# Patient Record
Sex: MALE | Race: WHITE | URBAN - METROPOLITAN AREA
[De-identification: names, ages, dates, MRNs, and addresses within clinical notes are randomized per-mention and may not be internally consistent; named-entity substitution may affect disease eponyms.]

---
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
## 2021-03-01 ENCOUNTER — APPOINTMENT (OUTPATIENT)
Dept: RADIOLOGY | Facility: IMAGING CENTER | Age: 21
End: 2021-03-01
Attending: PHYSICIAN ASSISTANT
Payer: COMMERCIAL

## 2021-03-01 ENCOUNTER — OFFICE VISIT (OUTPATIENT)
Dept: URGENT CARE | Facility: CLINIC | Age: 21
End: 2021-03-01
Payer: COMMERCIAL

## 2021-03-01 VITALS
BODY MASS INDEX: 22.2 KG/M2 | HEART RATE: 95 BPM | DIASTOLIC BLOOD PRESSURE: 76 MMHG | OXYGEN SATURATION: 98 % | HEIGHT: 74 IN | TEMPERATURE: 98.3 F | WEIGHT: 173 LBS | RESPIRATION RATE: 16 BRPM | SYSTOLIC BLOOD PRESSURE: 110 MMHG

## 2021-03-01 DIAGNOSIS — R07.9 CHEST PAIN, UNSPECIFIED TYPE: ICD-10-CM

## 2021-03-01 PROCEDURE — 71046 X-RAY EXAM CHEST 2 VIEWS: CPT | Mod: TC,FY | Performed by: PHYSICIAN ASSISTANT

## 2021-03-01 PROCEDURE — 99214 OFFICE O/P EST MOD 30 MIN: CPT | Performed by: PHYSICIAN ASSISTANT

## 2021-03-01 PROCEDURE — 93000 ELECTROCARDIOGRAM COMPLETE: CPT | Performed by: PHYSICIAN ASSISTANT

## 2021-03-01 RX ORDER — IBUPROFEN 800 MG/1
800 TABLET ORAL EVERY 8 HOURS PRN
Qty: 30 TABLET | Refills: 0 | Status: SHIPPED | OUTPATIENT
Start: 2021-03-01

## 2021-03-01 RX ORDER — OMEPRAZOLE 20 MG/1
20 CAPSULE, DELAYED RELEASE ORAL DAILY
Qty: 14 CAPSULE | Refills: 0 | Status: SHIPPED | OUTPATIENT
Start: 2021-03-01 | End: 2021-03-15

## 2021-03-01 ASSESSMENT — ENCOUNTER SYMPTOMS
HEARTBURN: 1
COUGH: 0
MYALGIAS: 0
DIAPHORESIS: 0
PALPITATIONS: 0
DIARRHEA: 0
CHILLS: 0
DIZZINESS: 0
NAUSEA: 0
ABDOMINAL PAIN: 0
HEADACHES: 0
HEMOPTYSIS: 0
FEVER: 0
VOMITING: 0
BLOOD IN STOOL: 0

## 2021-03-01 ASSESSMENT — LIFESTYLE VARIABLES: SUBSTANCE_ABUSE: 0

## 2021-03-02 NOTE — PROGRESS NOTES
Subjective:   Parth Hernandez is a 20 y.o. male who presents for Chest Pain (burning/ pulsating slight pain, comes and goes, worse at times)      HPI:  This is a very pleasant 20-year-old male presenting to the clinic with left-sided chest pain x7 days.  Patient describes the pain as a burning/pulsating sensation.  The pain seems to come and go lasting seconds to minutes then resolving.  The pain is localized to his left chest.  There is no radiation.  He is having a hard time thinking of any aggravating or alleviating factors.  He believes it may be flared up after meals.  He does not believe it is brought on with exercise.  He denies any associated shortness of breath, cough or congestion when these events occur.  Denies any nausea or vomiting.  Denies any recent upper respiratory infection or illness.  Denies any history of asthma.  Denies any history of connective tissue disease or autoimmune disease.  No history of heart disease in the family.  Patient does drink many carbonated drinks daily.  He has also been using them more frequently lately.  He does not drink any alcoholic beverages.  He is a non-smoker.  Does not use any drugs.  He denies any melena or blood in the stool.  Looking back at previous records from [place] it looks like he has had similar symptoms in the past and was diagnosed with GERD after a full cardiac work-up.  He has not been taking any antiacid medication at this time.    Review of Systems   Constitutional: Negative for chills, diaphoresis, fever and malaise/fatigue.   Respiratory: Negative for cough and hemoptysis.    Cardiovascular: Positive for chest pain. Negative for palpitations.   Gastrointestinal: Positive for heartburn. Negative for abdominal pain, blood in stool, diarrhea, melena, nausea and vomiting.   Musculoskeletal: Negative for myalgias.   Skin: Negative for rash.   Neurological: Negative for dizziness and headaches.   Endo/Heme/Allergies: Negative for environmental  "allergies.   Psychiatric/Behavioral: Negative for substance abuse.       Medications:    • This patient does not have an active medication from one of the medication groupers.    Allergies: Patient has no known allergies.    Problem List: Parth Hernandez does not have a problem list on file.    Surgical History:  No past surgical history on file.    Past Social Hx: Parth Hernandez  reports that he has never smoked. He has never used smokeless tobacco.     Past Family Hx:  Parth Hernandez family history is not on file.     Problem list, medications, and allergies reviewed by myself today in Epic.     Objective:     /76 (BP Location: Right arm, Patient Position: Sitting, BP Cuff Size: Adult)   Pulse 95   Temp 36.8 °C (98.3 °F) (Temporal)   Resp 16   Ht 1.88 m (6' 2\")   Wt 78.5 kg (173 lb)   SpO2 98%   BMI 22.21 kg/m²     Physical Exam  Constitutional:       General: He is not in acute distress.     Appearance: Normal appearance. He is not ill-appearing, toxic-appearing or diaphoretic.   HENT:      Head: Normocephalic and atraumatic.      Mouth/Throat:      Pharynx: No oropharyngeal exudate or posterior oropharyngeal erythema.   Eyes:      Conjunctiva/sclera: Conjunctivae normal.   Cardiovascular:      Rate and Rhythm: Normal rate and regular rhythm.      Pulses: Normal pulses.      Heart sounds: Normal heart sounds.   Pulmonary:      Effort: Pulmonary effort is normal.      Breath sounds: Normal breath sounds. No wheezing or rales.   Abdominal:      General: Bowel sounds are normal. There is no distension.      Palpations: Abdomen is soft.      Tenderness: There is no abdominal tenderness. There is no guarding.   Musculoskeletal:      Cervical back: Normal range of motion. No rigidity or tenderness.   Lymphadenopathy:      Cervical: No cervical adenopathy.   Skin:     General: Skin is warm.      Capillary Refill: Capillary refill takes less than 2 seconds.   Neurological:      General: No focal " deficit present.      Mental Status: He is alert and oriented to person, place, and time. Mental status is at baseline.   Psychiatric:         Mood and Affect: Mood normal.         Behavior: Behavior normal.         Thought Content: Thought content normal.         Judgment: Judgment normal.       12- Lead EKG; interpreted by myself   Normal sinus rhythm with a rate of 65 bpm.   Normal axis.  Normal intervals.   Mild ST segment elevation in V3.  No reciprocal changes.  No widening of QRS complex   No diagnostic Q waves.   No previous EKG for comparison.    Clinical Impression: Normal sinus rhythm.  Potential acute pericarditis.    Chest x-ray:  FINDINGS:     LUNGS: Clear. No effusions.  PNEUMOTHORAX: None.  LINES AND TUBES: None.  MEDIASTINUM: No cardiomegaly.  MUSCULOSKELETAL STRUCTURES: No acute displaced fracture.     IMPRESSION:  No acute cardiopulmonary abnormality.      Assessment/Plan:     Diagnosis and associated orders:     1. Chest pain, unspecified type    - EKG - Clinic Performed  - DX-CHEST-2 VIEWS; Future  - ibuprofen (MOTRIN) 800 MG Tab; Take 1 tablet by mouth every 8 hours as needed for Inflammation.  Dispense: 30 tablet; Refill: 0  - omeprazole (PRILOSEC) 20 MG delayed-release capsule; Take 1 capsule by mouth every day for 14 days.  Dispense: 14 capsule; Refill: 0  -Referral to cardiology     Comments/MDM:       • Differential diagnoses and treatment options were discussed with the patient at length today.  Differentials include cardiac ischemia versus pericarditis versus gastric reflux versus pneumonia.  EKG revealed slight ST elevation in lead V3.  There were no reciprocal changes appreciated on the EKG.  Patient had a normal chest x-ray with no acute cardiopulmonary abnormalities.  Lung sounds are clear to auscultation.  Patient was not experiencing any chest pain in clinic.  No pericardial friction rubs were noted.  He denies any preceding upper respiratory infection or illness.  Pain is not  brought on with exertion.  He occasionally has pain after meals but not all the time.  He has had a cardiac work-up approximately 1 year ago that showed no cardiac processes.  He was then treated for GERD.  At this time the etiology of his chest pain is unclear.  We discussed further work-up in the emergency department.  He elected to closely monitor his symptoms.  I placed a referral for him to follow-up with cardiology.  He agreed if any of the red flag symptoms were discussed in clinic present she will go straight to the emergency department.  I encouraged him to call with any questions or concerns.  Take ibuprofen as directed and with food.  Omeprazole provided to take if reflux-like symptoms present.             Differential diagnosis, natural history, supportive care, and indications for immediate follow-up discussed.    Advised the patient to follow-up with the primary care physician for recheck, reevaluation, and consideration of further management.    Please note that this dictation was created using voice recognition software. I have made reasonable attempt to correct obvious errors, but I expect that there are errors of grammar and possibly content that I did not discover before finalizing the note.    This note was electronically signed by BYRON Hong PA-C

## 2021-05-05 ENCOUNTER — TELEPHONE (OUTPATIENT)
Dept: SCHEDULING | Facility: IMAGING CENTER | Age: 21
End: 2021-05-05

## 2021-05-20 ENCOUNTER — APPOINTMENT (OUTPATIENT)
Dept: MEDICAL GROUP | Facility: MEDICAL CENTER | Age: 21
End: 2021-05-20

## 2021-06-08 ENCOUNTER — APPOINTMENT (OUTPATIENT)
Dept: MEDICAL GROUP | Facility: MEDICAL CENTER | Age: 21
End: 2021-06-08

## 2024-12-18 ENCOUNTER — OFFICE VISIT (OUTPATIENT)
Dept: URGENT CARE | Facility: CLINIC | Age: 24
End: 2024-12-18
Payer: COMMERCIAL

## 2024-12-18 VITALS
OXYGEN SATURATION: 98 % | TEMPERATURE: 97.1 F | RESPIRATION RATE: 18 BRPM | SYSTOLIC BLOOD PRESSURE: 108 MMHG | WEIGHT: 225 LBS | DIASTOLIC BLOOD PRESSURE: 86 MMHG | HEIGHT: 74 IN | BODY MASS INDEX: 28.88 KG/M2 | HEART RATE: 80 BPM

## 2024-12-18 DIAGNOSIS — R09.82 POST-NASAL DRAINAGE: ICD-10-CM

## 2024-12-18 DIAGNOSIS — R05.2 SUBACUTE COUGH: ICD-10-CM

## 2024-12-18 PROCEDURE — 3079F DIAST BP 80-89 MM HG: CPT | Performed by: REGISTERED NURSE

## 2024-12-18 PROCEDURE — 99203 OFFICE O/P NEW LOW 30 MIN: CPT | Performed by: REGISTERED NURSE

## 2024-12-18 PROCEDURE — 3074F SYST BP LT 130 MM HG: CPT | Performed by: REGISTERED NURSE

## 2024-12-18 RX ORDER — DEXTROMETHORPHAN HYDROBROMIDE AND PROMETHAZINE HYDROCHLORIDE 15; 6.25 MG/5ML; MG/5ML
5 SYRUP ORAL EVERY 4 HOURS PRN
Qty: 120 ML | Refills: 0 | Status: SHIPPED | OUTPATIENT
Start: 2024-12-18

## 2024-12-18 RX ORDER — FLUTICASONE PROPIONATE 50 MCG
2 SPRAY, SUSPENSION (ML) NASAL DAILY
Qty: 9.9 ML | Refills: 0 | Status: SHIPPED | OUTPATIENT
Start: 2024-12-18

## 2024-12-18 RX ORDER — PREDNISONE 10 MG/1
30 TABLET ORAL DAILY
Qty: 15 TABLET | Refills: 0 | Status: SHIPPED | OUTPATIENT
Start: 2024-12-18 | End: 2024-12-23

## 2024-12-18 ASSESSMENT — ENCOUNTER SYMPTOMS
FEVER: 0
DIZZINESS: 0
WHEEZING: 0
SHORTNESS OF BREATH: 0
ABDOMINAL PAIN: 0

## 2024-12-18 NOTE — LETTER
December 18, 2024         Patient: Parth Hernandez   YOB: 2000   Date of Visit: 12/18/2024           To Whom it May Concern:    Parth Hernandez was seen in my clinic on 12/18/2024. He may return to work on 12/19/24.    If you have any questions or concerns, please don't hesitate to call.        Sincerely,           ORION Hui  Electronically Signed

## 2024-12-19 NOTE — PROGRESS NOTES
"Subjective:   Parth Hernandez is a 24 y.o. male who presents for Cough (X4days, cough, slight sob, worse at night)      HPI  3-4 weeks of cough, seems to be worse at night. Typically dry and NP. Hasn't tried any medication for this recently. No chronic medical problems. Tolerating PO. Immunizations are current.     Review of Systems   Constitutional:  Negative for fever.   Respiratory:  Negative for shortness of breath and wheezing.    Cardiovascular:  Negative for chest pain.   Gastrointestinal:  Negative for abdominal pain.   Skin:  Negative for rash.   Neurological:  Negative for dizziness.       No Known Allergies    There are no active problems to display for this patient.      Current Outpatient Medications Ordered in Epic   Medication Sig Dispense Refill    predniSONE (DELTASONE) 10 MG Tab Take 3 Tablets by mouth every day for 5 days. 15 Tablet 0    promethazine-dextromethorphan (PROMETHAZINE-DM) 6.25-15 MG/5ML syrup Take 5 mL by mouth every four hours as needed for Cough. 120 mL 0    fluticasone (FLONASE) 50 MCG/ACT nasal spray Administer 2 Sprays into affected nostril(S) every day. 9.9 mL 0    ibuprofen (MOTRIN) 800 MG Tab Take 1 tablet by mouth every 8 hours as needed for Inflammation. (Patient not taking: Reported on 12/18/2024) 30 tablet 0     No current Epic-ordered facility-administered medications on file.       No past surgical history on file.    Social History     Tobacco Use    Smoking status: Never    Smokeless tobacco: Never   Vaping Use    Vaping status: Never Used       family history is not on file.     Problem list, medications, and allergies reviewed by myself today in Epic.     Objective:   /86 (BP Location: Left arm, Patient Position: Sitting, BP Cuff Size: Adult)   Pulse 80   Temp 36.2 °C (97.1 °F) (Temporal)   Resp 18   Ht 1.88 m (6' 2\")   Wt 102 kg (225 lb)   SpO2 98%   BMI 28.89 kg/m²     Physical Exam  Vitals and nursing note reviewed.   Constitutional:       General: " He is not in acute distress.     Appearance: Normal appearance. He is well-developed. He is not ill-appearing, toxic-appearing or diaphoretic.   HENT:      Head: Normocephalic and atraumatic.      Right Ear: Tympanic membrane, ear canal and external ear normal.      Left Ear: Tympanic membrane, ear canal and external ear normal.      Nose: Nose normal. No congestion or rhinorrhea.      Mouth/Throat:      Mouth: Mucous membranes are moist.      Pharynx: Oropharynx is clear. Postnasal drip present. No oropharyngeal exudate or posterior oropharyngeal erythema.   Eyes:      General:         Right eye: No discharge.         Left eye: No discharge.      Conjunctiva/sclera: Conjunctivae normal.   Cardiovascular:      Rate and Rhythm: Normal rate and regular rhythm.      Pulses: Normal pulses.      Heart sounds: Normal heart sounds. No murmur heard.  Pulmonary:      Effort: Pulmonary effort is normal. No respiratory distress.      Breath sounds: Normal breath sounds. No wheezing, rhonchi or rales.   Chest:      Chest wall: No tenderness.   Musculoskeletal:         General: No swelling or tenderness.      Cervical back: Normal range of motion and neck supple.      Right lower leg: No edema.      Left lower leg: No edema.   Lymphadenopathy:      Cervical: No cervical adenopathy.   Skin:     General: Skin is warm and dry.   Neurological:      General: No focal deficit present.      Mental Status: He is alert and oriented to person, place, and time. Mental status is at baseline.   Psychiatric:         Mood and Affect: Mood normal.         Behavior: Behavior normal.         Thought Content: Thought content normal.         Judgment: Judgment normal.         Assessment/Plan:     I personally reviewed prior external notes and test results pertinent to today's visit as well as additional imaging and testing completed in clinic today.    I introduced myself as Girma Dougherty a Nurse Practitioner.    1. Subacute cough  predniSONE  (DELTASONE) 10 MG Tab    promethazine-dextromethorphan (PROMETHAZINE-DM) 6.25-15 MG/5ML syrup      2. Post-nasal drainage  fluticasone (FLONASE) 50 MCG/ACT nasal spray        Very pleasant 24-year-old with a cough that seems to linger x 4 weeks does wax and wane but typically worse at night.  Has become nonproductive.  No fevers weight loss hemoptysis or shortness of breath.  No chronic health issues.  Not using medications.  His vitals are reassuring with no tachycardia or hypoxia.  Exam is also reassuring and did note some postnasal drainage but no evidence of secondary bacterial infection.  Will place on prednisone and Promethazine DM, as well as Flonase to help with postnasal drainage.  Discussed other supportive measures including pulmonary toilet, increasing fluids, ambulation as tolerated.. Medication discussed included indication for use and the potential benefits and side effects. The Patient was encouraged to monitor symptoms closely, and we reviewed the signs and symptoms that require a higher level of care through the emergency department. Patient verbalized understanding.    Please note that this dictation was created using voice recognition software. I have made every reasonable attempt to correct obvious errors, but I expect that there are errors of grammar and possibly content that I did not discover before finalizing the note.    This note was electronically signed by ORION Hui

## 2025-05-28 ENCOUNTER — TELEPHONE (OUTPATIENT)
Dept: SCHEDULING | Facility: IMAGING CENTER | Age: 25
End: 2025-05-28
Payer: COMMERCIAL